# Patient Record
Sex: FEMALE | Race: WHITE | NOT HISPANIC OR LATINO | Employment: PART TIME | URBAN - METROPOLITAN AREA
[De-identification: names, ages, dates, MRNs, and addresses within clinical notes are randomized per-mention and may not be internally consistent; named-entity substitution may affect disease eponyms.]

---

## 2018-08-14 ENCOUNTER — OFFICE VISIT CONVERTED (OUTPATIENT)
Dept: SURGERY | Facility: CLINIC | Age: 53
End: 2018-08-14
Attending: SURGERY

## 2018-09-07 ENCOUNTER — OFFICE VISIT CONVERTED (OUTPATIENT)
Dept: SURGERY | Facility: CLINIC | Age: 53
End: 2018-09-07
Attending: SURGERY

## 2018-09-13 ENCOUNTER — OFFICE VISIT CONVERTED (OUTPATIENT)
Dept: GASTROENTEROLOGY | Facility: CLINIC | Age: 53
End: 2018-09-13
Attending: PHYSICIAN ASSISTANT

## 2018-09-13 ENCOUNTER — CONVERSION ENCOUNTER (OUTPATIENT)
Dept: GASTROENTEROLOGY | Facility: CLINIC | Age: 53
End: 2018-09-13

## 2021-05-16 VITALS
HEART RATE: 98 BPM | OXYGEN SATURATION: 100 % | DIASTOLIC BLOOD PRESSURE: 63 MMHG | SYSTOLIC BLOOD PRESSURE: 114 MMHG | HEIGHT: 66 IN | RESPIRATION RATE: 16 BRPM | WEIGHT: 120 LBS | BODY MASS INDEX: 19.29 KG/M2

## 2021-05-16 VITALS — BODY MASS INDEX: 19.93 KG/M2 | HEIGHT: 66 IN | RESPIRATION RATE: 14 BRPM | WEIGHT: 124 LBS

## 2021-05-16 VITALS — WEIGHT: 124 LBS | BODY MASS INDEX: 19.93 KG/M2 | HEIGHT: 66 IN | RESPIRATION RATE: 14 BRPM

## 2023-01-27 ENCOUNTER — OFFICE VISIT (OUTPATIENT)
Dept: CARDIOLOGY | Facility: CLINIC | Age: 58
End: 2023-01-27
Payer: COMMERCIAL

## 2023-01-27 VITALS
BODY MASS INDEX: 22.4 KG/M2 | WEIGHT: 139.4 LBS | DIASTOLIC BLOOD PRESSURE: 72 MMHG | SYSTOLIC BLOOD PRESSURE: 137 MMHG | HEART RATE: 56 BPM | HEIGHT: 66 IN

## 2023-01-27 DIAGNOSIS — I49.3 FREQUENT PVCS: Primary | ICD-10-CM

## 2023-01-27 PROCEDURE — 99204 OFFICE O/P NEW MOD 45 MIN: CPT | Performed by: INTERNAL MEDICINE

## 2023-01-27 RX ORDER — METOPROLOL SUCCINATE 25 MG/1
12.5 TABLET, EXTENDED RELEASE ORAL DAILY
Qty: 90 TABLET | Refills: 3 | Status: SHIPPED | OUTPATIENT
Start: 2023-01-27

## 2023-01-27 RX ORDER — DIPHENHYDRAMINE HCL 25 MG
25 CAPSULE ORAL
COMMUNITY
End: 2023-03-16

## 2023-01-27 RX ORDER — FLUOXETINE HYDROCHLORIDE 20 MG/1
1 CAPSULE ORAL DAILY
COMMUNITY
Start: 2018-06-17

## 2023-01-27 NOTE — PROGRESS NOTES
"Chief Complaint  Palpitations (/)      History of Present Illness  Corrine Dias presents to [facility] CARDIOLOGY    This is a very pleasant 57-year-old lady with past medical history of depression/anxiety was referred to clinic for cardiac evaluation.  She has been having recurrent palpitations for a long time.  Her episodes are short-lived.  They are precipitated by excessive eating and fatigue.  She denies associated symptoms.  She has no other complaints.  She has no dyspnea, orthopnea, edema, presyncope or syncope.  She is not very physically active but denies symptoms with routine daily activities.  She had Holter monitor which demonstrated very frequent PVCs and was referred to us for further evaluation.    Past Medical History:   Diagnosis Date   • Sleep apnea          Current Outpatient Medications:   •  aspirin 81 MG oral suspension, , Disp: , Rfl:   •  diphenhydrAMINE (BENADRYL) 25 mg capsule, Take 25 mg by mouth., Disp: , Rfl:   •  FLUoxetine (PROzac) 20 MG capsule, Take 1 capsule by mouth Daily., Disp: , Rfl:   •  TRAZODONE HCL PO, , Disp: , Rfl:   •  metoprolol succinate XL (TOPROL-XL) 25 MG 24 hr tablet, Take 0.5 tablets by mouth Daily., Disp: 90 tablet, Rfl: 3    There are no discontinued medications.  Allergies   Allergen Reactions   • Penicillins Rash        Social History     Tobacco Use   • Smoking status: Former     Packs/day: 0.50     Years: 36.00     Pack years: 18.00     Types: Cigarettes     Start date: 1/15/1985     Quit date: 3/12/2021     Years since quittin.8   Substance Use Topics   • Alcohol use: Yes     Alcohol/week: 15.0 standard drinks     Types: 15 Cans of beer per week     Comment: I have a few beers most days.   • Drug use: Never       Family History   Problem Relation Age of Onset   • Hypertension Mother         Objective     /72   Pulse 56   Ht 167.6 cm (66\")   Wt 63.2 kg (139 lb 6.4 oz)   BMI 22.50 kg/m²       Physical " Exam  Constitutional:       General: Awake. Not in acute distress.     Appearance: Normal appearance.   Neck:      Vascular: No carotid bruit, hepatojugular reflux or JVD.   Cardiovascular:      Rate and Rhythm: Normal rate and regular rhythm.      Chest Wall: PMI is not displaced.      Heart sounds: Normal heart sounds, S1 normal and S2 normal. No murmur heard.   No friction rub. No gallop. No S3 or S4 sounds.    Pulmonary:      Effort: Pulmonary effort is normal.      Breath sounds: Normal breath sounds. No wheezing, rhonchi or rales.   Ext.      Right lower leg: No edema.      Left lower leg: No edema.   Skin:     General: Skin is warm and dry.      Coloration: Skin is not cyanotic.      Findings: No petechiae or rash.   Neurological:      Mental Status: Alert and oriented x 3  Psychiatric:         Behavior: Behavior is cooperative.       Result Review :     No results found for: PROBNP              Diagnoses and all orders for this visit:    1. Frequent PVCs (Primary)  -     Treadmill Stress Test; Future  -     Magnesium; Future  -     metoprolol succinate XL (TOPROL-XL) 25 MG 24 hr tablet; Take 0.5 tablets by mouth Daily.  Dispense: 90 tablet; Refill: 3  -     Holter Monitor - 24 Hour; Future      Assessment: Patient with very frequent PVCs and by recent cardiac monitor.  She has frequent short-lived palpitations.  Apart from that, she has no other symptoms.  Her exam today is unremarkable.  ECG shows normal sinus rhythm with frequent unifocal PVCs.  Blood work was reviewed and was unremarkable.  The patient will be started on metoprolol XL 12.5 mg daily.  She was advised to increase the dose to 25 mg daily in a week if her heart rate remained stable/above 50 beats per minute.  Repeat Holter monitor will be done in 3 to 4 weeks.  Patient will be scheduled for treadmill stress test to assess for exercise-induced arrhythmias and ischemic ST-T changes.  She is already scheduled for echocardiogram next week which  she was advised to keep.  I will check magnesium level.  Avoidance of excessive caffeine was recommended.  Further recommendations to follow.    Follow Up       No follow-ups on file.    Patient was given instructions and counseling regarding her condition or for health maintenance advice. Please see specific information pulled into the AVS if appropriate.

## 2023-02-10 ENCOUNTER — TELEPHONE (OUTPATIENT)
Dept: CARDIOLOGY | Facility: CLINIC | Age: 58
End: 2023-02-10
Payer: COMMERCIAL

## 2023-02-10 NOTE — TELEPHONE ENCOUNTER
She has a stress test and holter upcoming that will potentially help determine the cause of her symptoms. I would recommend she give the metoprolol full dose an opportunity to work. Diarrhea is not a common side effect of metoprolol and if it didn't cause that at the lower dose I wouldn't expect it to cause it at the increased dose.

## 2023-02-10 NOTE — TELEPHONE ENCOUNTER
Patient called stating after she starting the whole tablet of Metoprolol as directed, she started experiencing diarrhea, patient also feels as if this medication has not helped.     Please advise.

## 2023-02-13 NOTE — TELEPHONE ENCOUNTER
BLAYNE patient regarding medication. Patient verbalized understanding and appreciation- states she will give it some time and will improve her eating habits to assist in eliminating diarrhea.

## 2023-03-16 ENCOUNTER — OFFICE VISIT (OUTPATIENT)
Dept: CARDIOLOGY | Facility: CLINIC | Age: 58
End: 2023-03-16
Payer: COMMERCIAL

## 2023-03-16 VITALS
SYSTOLIC BLOOD PRESSURE: 130 MMHG | BODY MASS INDEX: 22.5 KG/M2 | DIASTOLIC BLOOD PRESSURE: 71 MMHG | WEIGHT: 140 LBS | HEIGHT: 66 IN | HEART RATE: 59 BPM

## 2023-03-16 DIAGNOSIS — I49.3 FREQUENT PVCS: Primary | ICD-10-CM

## 2023-03-16 PROCEDURE — 99213 OFFICE O/P EST LOW 20 MIN: CPT

## 2023-03-16 RX ORDER — CETIRIZINE HYDROCHLORIDE 10 MG/1
10 TABLET ORAL DAILY
COMMUNITY

## 2023-03-16 NOTE — PROGRESS NOTES
Chief Complaint  Frequent PVCs    Subjective        History of Present Illness  Corrine Dias presents to [facility] CARDIOLOGY for follow up.  57-year-old female with past medical history significant for sleep apnea and frequent PVCs.  She is following up on a recent Holter monitor.  She continues to have palpitations that have improved on her current dose of metoprolol.  She is compliant with her medication.  She denies any chest pain at all, but does get dyspneic with exertion.  She denies orthopnea, edema, syncope.  PMH  Past Medical History:   Diagnosis Date   • Sleep apnea 2016       ALLERGY  Allergies   Allergen Reactions   • Penicillins Rash        SURGICALHX  History reviewed. No pertinent surgical history.     SOC  Social History     Socioeconomic History   • Marital status:    Tobacco Use   • Smoking status: Former     Packs/day: 0.50     Years: 36.00     Pack years: 18.00     Types: Cigarettes     Start date: 1/15/1985     Quit date: 3/12/2021     Years since quittin.0   Substance and Sexual Activity   • Alcohol use: Yes     Alcohol/week: 15.0 standard drinks     Types: 15 Cans of beer per week     Comment: I have a few beers most days.   • Drug use: Never   • Sexual activity: Yes     Partners: Male     Birth control/protection: Post-menopausal       FAMHX  Family History   Problem Relation Age of Onset   • Hypertension Mother         MEDSIGONLY  Current Outpatient Medications on File Prior to Visit   Medication Sig   • aspirin 81 MG oral suspension    • cetirizine (zyrTEC) 10 MG tablet Take 1 tablet by mouth Daily.   • FLUoxetine (PROzac) 20 MG capsule Take 1 capsule by mouth Daily.   • metoprolol succinate XL (TOPROL-XL) 25 MG 24 hr tablet Take 0.5 tablets by mouth Daily.   • TRAZODONE HCL PO    • [DISCONTINUED] diphenhydrAMINE (BENADRYL) 25 mg capsule Take 25 mg by mouth. (Patient not taking: Reported on 3/16/2023)     No current facility-administered medications on  "file prior to visit.       Objective   Vitals:    03/16/23 1118 03/16/23 1122   BP: 145/73 130/71   Pulse: 58 59   Weight: 63.5 kg (140 lb)    Height: 167.6 cm (66\")          Physical Exam  Constitutional:       General: She is awake. She is not in acute distress.     Appearance: Normal appearance.   HENT:      Head: Normocephalic.      Nose: Nose normal. No congestion.   Eyes:      Extraocular Movements: Extraocular movements intact.      Conjunctiva/sclera: Conjunctivae normal.      Pupils: Pupils are equal, round, and reactive to light.   Neck:      Thyroid: No thyromegaly.      Vascular: No JVD.   Cardiovascular:      Rate and Rhythm: Normal rate. Rhythm irregular.      Chest Wall: PMI is not displaced.      Pulses: Normal pulses.      Heart sounds: Normal heart sounds, S1 normal and S2 normal. No murmur heard.    No friction rub. No gallop. No S3 or S4 sounds.   Pulmonary:      Effort: Pulmonary effort is normal.      Breath sounds: Normal breath sounds. No wheezing, rhonchi or rales.   Abdominal:      General: Bowel sounds are normal.      Palpations: Abdomen is soft.      Tenderness: There is no abdominal tenderness.   Musculoskeletal:      Cervical back: No tenderness.      Right lower leg: No edema.      Left lower leg: No edema.   Lymphadenopathy:      Cervical: No cervical adenopathy.   Skin:     General: Skin is warm and dry.      Capillary Refill: Capillary refill takes less than 2 seconds.      Coloration: Skin is not cyanotic.      Findings: No petechiae or rash.      Nails: There is no clubbing.   Neurological:      Mental Status: She is alert.   Psychiatric:         Mood and Affect: Mood normal.         Behavior: Behavior is cooperative.           Result Review     The following data was reviewed by ANGIE Green on 03/16/23.    No results found for: PROBNP             Results for orders placed in visit on 02/23/23    Treadmill Stress Test    Interpretation Summary  •  Excellent exercise " tolerance noted.  •  There was no chest pain with exercise.  •  Baseline EKG showed sinus rhythm with frequent isolated PVCs.  There was a significant decrease in PVC frequency during exercise.  •  EKG at peak heart rate did not show any ST segment changes to suggest ischemia.  Patient reached only 72% of the maximum predicted heart rate.  •  Conclusion : Exercise treadmill stress test is negative for ischemia at the workload achieved.  However patient could reach only 72% of the maximum predicted heart rate making the test somewhat suboptimal.           Assessment & Plan  Diagnoses and all orders for this visit:    1. Frequent PVCs (Primary)  -     Ambulatory Referral to Cardiac Electrophysiology    Recent echocardiogram demonstrated preserved RV and LV function with an ejection fraction of 54%, mild mitral regurgitation, trivial aortic insufficiency.    Recent Holter monitor demonstrated frequent PVCs constituting 18% of the total beats mostly in isolated form with few couplets noted.    Recent treadmill stress test was negative for ischemia albeit suboptimal due to inability to achieve target heart rate.    Continue metoprolol at 12.5 mg daily.  Of note she does not tolerate higher dose.  Will place referral to EP.    Follow Up   Return in about 4 months (around 7/16/2023) for With .    Patient was given instructions and counseling regarding her condition or for health maintenance advice. Please see specific information pulled into the AVS if appropriate.     Susan Sommers, ANGIE  03/16/23  08:51 EDT    Dictated Utilizing Dragon Dictation

## 2023-06-12 NOTE — PROGRESS NOTES
Electrophysiology Clinic Consult     Corrine Dias  1965  [unfilled]  [unfilled]    06/13/23    DATE OF ADMISSION: (Not on file)  [facility] CARDIOLOGY Jac Beasley, APRN  [address] / [place] [address]  Referring Provider: Susan Sommers*     CC: palpitations    Problem list:   PVCs  Echo 2/6/19: EF 55%  Metoprolol initiated   24h holter monitor 1/18/23: frequent PVCs, multiple episodes of bigeminy, trigeminy and quadrigeminy. Avg HR 86 bpm, HR range  bpm.   24h Holter monitor 2/28/23: PVC 18% burden, avg HR 72, range HR .   Stress test 2/23/23: frequent PVCs, no ischemia however only reached 72% of max predicted heart rate, somewhat suboptimal test  Echo 2/1/23: LVEF: 54%, mild left atrial enlargement, mild MR  Sleep apnea - cpap  Former smoker   Anxiety disorder.  At    EKG with PVCs 12/9/22    History of Present Illness:   Corrine Dias is a 57 year old female with above PMHx referred by ANGIE Harley for management of PVCs. She was told she had PVCs during pregnancy 28 years ago. Symptoms of skipped beat, heart racing, fatigue, intermittent SOB, dizziness and LH. Episodes have increased in frequency in the last year.  It in severity.  Her symptoms are not improved or worsened by any particular maneuver or medication.  She was started on Metoprolol and she has not noticed a difference in symptoms but she has noted increased fatigue. BP is well controlled.  Denies any near-syncope or syncope.  No active chest pain or chest tightness.  Denies any weight loss supplements.  No over-the-counter stimulants.  Caffeine: none  Alcohol: 2-3 beers per day   Stimulant: none  Nicotine: none, quit smoking in 2021      Allergies   Allergen Reactions    Penicillins Rash        Cannot display prior to admission medications because the patient has not been admitted in this contact.              Current Outpatient Medications:     aspirin 81 MG  oral suspension, , Disp: , Rfl:     cetirizine (zyrTEC) 10 MG tablet, Take 1 tablet by mouth Daily., Disp: , Rfl:     FLUoxetine (PROzac) 20 MG capsule, Take 1 capsule by mouth Daily., Disp: , Rfl:     metoprolol succinate XL (TOPROL-XL) 25 MG 24 hr tablet, Take 0.5 tablets by mouth Daily., Disp: 90 tablet, Rfl: 3    traZODone (DESYREL) 50 MG tablet, Take 1 tablet by mouth Daily., Disp: , Rfl:     Social History     Socioeconomic History    Marital status:    Tobacco Use    Smoking status: Former     Packs/day: 0.50     Years: 36.00     Pack years: 18.00     Types: Cigarettes     Start date: 1/15/1985     Quit date: 3/12/2021     Years since quittin.2    Smokeless tobacco: Never   Substance and Sexual Activity    Alcohol use: Yes     Alcohol/week: 15.0 standard drinks     Types: 15 Cans of beer per week     Comment: I have a few beers most days.    Drug use: Never    Sexual activity: Yes     Partners: Male     Birth control/protection: Post-menopausal       Family History   Problem Relation Age of Onset    Hypertension Mother        REVIEW OF SYSTEMS:   CONST:  No weight loss, fever, chills, +weakness + fatigue.   HEENT:  No visual loss, blurred vision, double vision, yellow sclerae.                   No hearing loss, congestion, sore throat.   SKIN:      No rashes, urticaria, ulcers, sores.     RESP:     + shortness of breath, -hemoptysis, cough, sputum.   GI:           No anorexia, nausea, vomiting, diarrhea. No abdominal pain, melena.   :         No burning on urination, hematuria or increased frequency.  ENDO:    No diaphoresis, cold or heat intolerance. No polyuria or polydipsia.   NEURO:  No headache, +dizziness, -syncope, paralysis, ataxia, or parasthesias.                  No change in bowel or bladder control. No history of CVA/TIA  MUSC:    No muscle, back pain, joint pain or stiffness.   HEME:    No anemia, bleeding, bruising. No history of DVT/PE.  PSYCH:  + history of depression,  "anxiety    Vitals:    06/13/23 1230   BP: 134/84   BP Location: Left arm   Patient Position: Sitting   Pulse: 52   Weight: 63.5 kg (140 lb)   Height: 167.6 cm (66\")                 Physical Exam:  GEN: Well nourished, well-developed, no acute distress  HEENT: Normocephalic, atraumatic, PERRLA, moist mucous membranes  NECK: Supple, NO JVD, no thyromegaly, no lymphadenopathy  CARD: S1S2, bradyca regular rate rhythm normal S1-S2 bradycardic no S3-S4 murmurs  ABDOMEN: Soft, nontender, normal bowel sounds  EXTREMITIES: No gross deformities, no clubbing, cyanosis, or edema  SKIN: Warm, dry  NEURO: No focal deficits, alert and oriented x 3  PSYCHIATRIC: Normal affect and mood      I personally viewed and interpreted the patient's EKG/Telemetry/lab data    No results found for: GLUCOSE, CALCIUM, NA, K, CO2, CL, BUN, CREATININE, EGFRIFAFRI, EGFRIFNONA, BCR, ANIONGAP  No results found for: WBC, HGB, HCT, MCV, PLT  No results found for: INR, PROTIME  No results found for: TSH, M1YQBGV, W4DWALC, THYROIDAB        ECG 12 Lead    Date/Time: 6/13/2023 1:13 PM  Performed by: Jersey Barclay MD  Authorized by: Jersey Barclay MD   Comparison: compared with previous ECG from 12/9/2022  Comparison to previous ECG: PVC resolved  Rhythm: sinus bradycardia  BPM: 52          ICD-10-CM ICD-9-CM   1. PVC (premature ventricular contraction)  I49.3 427.69   2. PEPE (obstructive sleep apnea)  G47.33 327.23       Assessment and Plan:   PVCs  -symptoms of skipped beat, heart racing, fatigue, intermittent SOB, dizziness and LH. Episodes have increased in frequency in the last year. She was started on Metoprolol and she has not noticed a difference in symptoms but she has noted increased fatigue.   -24h Holter monitor 2/28/23: PVC 18% burden, avg HR 72, range HR .   -Echo 2/1/23: LVEF: 54%, mild left atrial enlargement, mild MR  -Discussed options of starting  antiarrhythmic medication vs. EPS +/- catheter ablation of PVCs. The risks, " benefits, and alternatives of the procedure and medication have been reviewed and the patient wishes to proceed with Flecainide. Stop Metoprolol today. Start Flecainide 50mg BID on Monday 6/19/23, EKG 48 hours after starting following 5th dose of Flecainide 6/21/23.       2. Sleep apnea - cpap     Scribed for Jersey Barclay MD by ANGIE Diaz. 6/13/2023  13:14 EDT    I, Jersey Barclay MD, personally performed the services described in this documentation as scribed by the above named individual in my presence, and it is both accurate and complete.  6/13/2023  13:14 EDT

## 2023-06-13 ENCOUNTER — OFFICE VISIT (OUTPATIENT)
Dept: CARDIOLOGY | Facility: CLINIC | Age: 58
End: 2023-06-13
Payer: COMMERCIAL

## 2023-06-13 VITALS
SYSTOLIC BLOOD PRESSURE: 134 MMHG | DIASTOLIC BLOOD PRESSURE: 84 MMHG | WEIGHT: 140 LBS | HEART RATE: 52 BPM | BODY MASS INDEX: 22.5 KG/M2 | HEIGHT: 66 IN

## 2023-06-13 DIAGNOSIS — I49.3 PVC (PREMATURE VENTRICULAR CONTRACTION): Primary | ICD-10-CM

## 2023-06-13 DIAGNOSIS — G47.33 OSA (OBSTRUCTIVE SLEEP APNEA): ICD-10-CM

## 2023-06-13 PROBLEM — G47.30 SLEEP APNEA: Status: ACTIVE | Noted: 2023-06-13

## 2023-06-13 RX ORDER — FLECAINIDE ACETATE 50 MG/1
50 TABLET ORAL 2 TIMES DAILY
Qty: 180 TABLET | Refills: 3 | Status: SHIPPED | OUTPATIENT
Start: 2023-06-13

## 2023-11-14 ENCOUNTER — OFFICE VISIT (OUTPATIENT)
Dept: CARDIOLOGY | Facility: CLINIC | Age: 58
End: 2023-11-14
Payer: COMMERCIAL

## 2023-11-14 VITALS
OXYGEN SATURATION: 97 % | DIASTOLIC BLOOD PRESSURE: 86 MMHG | HEIGHT: 66 IN | BODY MASS INDEX: 22.5 KG/M2 | SYSTOLIC BLOOD PRESSURE: 136 MMHG | WEIGHT: 140 LBS | HEART RATE: 58 BPM

## 2023-11-14 DIAGNOSIS — I49.3 PVC (PREMATURE VENTRICULAR CONTRACTION): Primary | ICD-10-CM

## 2023-11-14 DIAGNOSIS — G47.33 OBSTRUCTIVE SLEEP APNEA SYNDROME: ICD-10-CM

## 2023-11-14 PROCEDURE — 93000 ELECTROCARDIOGRAM COMPLETE: CPT | Performed by: PHYSICIAN ASSISTANT

## 2023-11-14 PROCEDURE — 99214 OFFICE O/P EST MOD 30 MIN: CPT | Performed by: PHYSICIAN ASSISTANT

## 2023-11-14 NOTE — PROGRESS NOTES
Corrine Padron Prescott  1965  [phone]    11/14/2023    [facility] CARDIOLOGY     Referring Provider: No ref. provider found     Jac Aleman, APRN  [address]  [facility] [address]    Chief Complaint   Patient presents with    PVC (premature ventricular contraction)       Problem List:   Problem list:   PVCs  Echo 2/6/19: EF 55%  Metoprolol initiated   24h holter monitor 1/18/23: frequent PVCs, multiple episodes of bigeminy, trigeminy and quadrigeminy. Avg HR 86 bpm, HR range  bpm.   24h Holter monitor 2/28/23: PVC 18% burden, avg HR 72, range HR .   Stress test 2/23/23: frequent PVCs, no ischemia however only reached 72% of max predicted heart rate, somewhat suboptimal test  Echo 2/1/23: LVEF: 54%, mild left atrial enlargement, mild MR  Sleep apnea - cpap  Former smoker   Anxiety disorder.    Allergies  Allergies   Allergen Reactions    Penicillins Rash       Current Medications    Current Outpatient Medications:     aspirin 81 MG oral suspension, , Disp: , Rfl:     cetirizine (zyrTEC) 10 MG tablet, Take 1 tablet by mouth Daily., Disp: , Rfl:     flecainide (TAMBOCOR) 50 MG tablet, Take 1 tablet by mouth 2 (Two) Times a Day. Stop Metoprolol, Disp: 180 tablet, Rfl: 3    FLUoxetine (PROzac) 20 MG capsule, Take 1 capsule by mouth Daily., Disp: , Rfl:     traZODone (DESYREL) 50 MG tablet, Take 1 tablet by mouth Daily., Disp: , Rfl:     History of Present Illness     Pt presents for follow up of PVCs on Flecainide. Since we last saw the pt, she is doing much better on Flecainide in regards to her PVCs. She is happy with how it has helped her. She  SOB, CP, LH, and dizziness or syncope. Denies any hospitalizations, ER visits, Overall feels well.    ROS:  General:  Denies fatigue, weight gain or loss  Cardiovascular:  Denies CP, PND, syncope, near syncope, edema or palpitations.  Pulmonary:  Denies ARANDA, cough, or wheezing      Vitals:    11/14/23 1429   BP: 136/86   BP  "Location: Left arm   Patient Position: Sitting   Pulse: 58   SpO2: 97%   Weight: 63.5 kg (140 lb)   Height: 167.6 cm (66\")     Body mass index is 22.6 kg/m².  PE:  General: NAD  Neck: no JVD, no carotid bruits, no TM  Heart RRR, NL S1, S2, S4 present, no rubs, murmurs  Lungs: CTA, no wheezes, rhonchi, or rales  Abd: soft, non-tender, NL BS  Ext: No musculoskeletal deformities, no edema, cyanosis, or clubbing  Psych: normal mood and affect    Diagnostic Data:        ECG 12 Lead    Date/Time: 11/14/2023 3:19 PM  Performed by: Ayesha Albright PA    Authorized by: Ayesha Albright PA  Comparison: compared with previous ECG from 6/21/2023  Similar to previous ECG  Rhythm: sinus rhythm  BPM: 58              1. PVC (premature ventricular contraction)    2. Obstructive sleep apnea syndrome          Plan:  PVCs:   24h Holter monitor 2/28/23: PVC 18% burden, avg HR 72, range HR .   -Echo 2/1/23: LVEF: 54%, mild left atrial enlargement, mild MR  - doing well on Flecainide 50 mg BID. QRS normal on EKG today.   2. PEPE: CPAP    F/up in 6-8  months    Electronically signed by HALIE Mack, 11/14/23, 3:18 PM EST.    "

## 2024-05-31 RX ORDER — FLECAINIDE ACETATE 50 MG/1
50 TABLET ORAL 2 TIMES DAILY
Qty: 60 TABLET | Refills: 1 | Status: SHIPPED | OUTPATIENT
Start: 2024-05-31

## 2024-06-11 ENCOUNTER — OFFICE VISIT (OUTPATIENT)
Dept: CARDIOLOGY | Facility: CLINIC | Age: 59
End: 2024-06-11
Payer: COMMERCIAL

## 2024-06-11 VITALS
BODY MASS INDEX: 22.5 KG/M2 | WEIGHT: 140 LBS | HEIGHT: 66 IN | HEART RATE: 53 BPM | OXYGEN SATURATION: 98 % | SYSTOLIC BLOOD PRESSURE: 136 MMHG | DIASTOLIC BLOOD PRESSURE: 78 MMHG

## 2024-06-11 DIAGNOSIS — I49.3 PVC (PREMATURE VENTRICULAR CONTRACTION): Primary | ICD-10-CM

## 2024-06-11 PROCEDURE — 99213 OFFICE O/P EST LOW 20 MIN: CPT | Performed by: INTERNAL MEDICINE

## 2024-06-11 PROCEDURE — 93000 ELECTROCARDIOGRAM COMPLETE: CPT | Performed by: INTERNAL MEDICINE

## 2024-06-11 RX ORDER — UREA 10 %
5 LOTION (ML) TOPICAL NIGHTLY
COMMUNITY

## 2024-06-11 NOTE — PROGRESS NOTES
Corrine Padron Morehead  1965  [phone]    06/11/2024    [facility] CARDIOLOGY     Referring Provider: No ref. provider found     Jac Aleman, APRN  [address]  [facility] [address]    Chief Complaint   Patient presents with    premature ventricular contraction       Problem List:     PVCs  Echo 2/6/19: EF 55%  Metoprolol initiated   24h holter monitor 1/18/23: frequent PVCs, multiple episodes of bigeminy, trigeminy and quadrigeminy. Avg HR 86 bpm, HR range  bpm.   24h Holter monitor 2/28/23: PVC 18% burden, avg HR 72, range HR .   Stress test 2/23/23: frequent PVCs, no ischemia however only reached 72% of max predicted heart rate, somewhat suboptimal test  Echo 2/1/23: LVEF: 54%, mild left atrial enlargement, mild MR  Sleep apnea - cpap  Former smoker   Anxiety disorder.     Allergies  Allergies   Allergen Reactions    Penicillins Rash       Current Medications    Current Outpatient Medications:     aspirin 81 MG oral suspension, , Disp: , Rfl:     cetirizine (zyrTEC) 10 MG tablet, Take 1 tablet by mouth Daily., Disp: , Rfl:     flecainide (TAMBOCOR) 50 MG tablet, TAKE 1 TABLET BY MOUTH 2 (TWO) TIMES A DAY. STOP METOPROLOL, Disp: 60 tablet, Rfl: 1    FLUoxetine (PROzac) 20 MG capsule, Take 1 capsule by mouth Daily., Disp: , Rfl:     melatonin 5 MG tablet tablet, Take 1 tablet by mouth Every Night., Disp: , Rfl:     traZODone (DESYREL) 50 MG tablet, Take 1 tablet by mouth Daily., Disp: , Rfl:     History of Present Illness     Pt presents for follow up of PVC. Since we last saw the pt, pt denies sustained tachypalpitations, SOB, CP, LH, and dizziness. Denies any hospitalizations, ER visits, bleeding, or TIA/CVA symptoms. Overall feels well.  Blood pressure been well-controlled.  She has been compliant with her medical therapy.  No other complaints at this time.  She does continue to use CPAP at nighttime.          Vitals:    06/11/24 1406   BP: 136/78   BP Location:  "Left arm   Patient Position: Sitting   Pulse: 53   SpO2: 98%   Weight: 63.5 kg (140 lb)   Height: 167.6 cm (66\")     Body mass index is 22.6 kg/m².  PE:  General: NAD  Neck: no JVD, no carotid bruits, no TM  Heart RRR, NL S1, S2, S4 present, no rubs, murmurs  Lungs: CTA, no wheezes, rhonchi, or rales  Abd: soft, non-tender, NL BS  Ext: No musculoskeletal deformities, no edema, cyanosis, or clubbing  Psych: normal mood and affect    Diagnostic Data:        ECG 12 Lead    Date/Time: 6/11/2024 2:25 PM  Performed by: Jersey Barclay MD    Authorized by: Jersey Barclay MD  Comparison: compared with previous ECG from 11/14/2023  Similar to previous ECG  Rhythm: sinus bradycardia  BPM: 52              1. PVC (premature ventricular contraction)          Plan:    PVCs:   24h Holter monitor 2/28/23: PVC 18% burden, avg HR 72, range HR .   -Echo 2/1/23: LVEF: 54%, mild left atrial enlargement, mild MR  - doing well on Flecainide 50 mg BID. QRS normal on EKG today.   2. PEPE: CPAP    F/up in 6 months      "

## 2024-07-26 RX ORDER — FLECAINIDE ACETATE 50 MG/1
50 TABLET ORAL 2 TIMES DAILY
Qty: 60 TABLET | Refills: 3 | Status: SHIPPED | OUTPATIENT
Start: 2024-07-26

## 2024-12-11 RX ORDER — FLECAINIDE ACETATE 50 MG/1
TABLET ORAL
Qty: 60 TABLET | Refills: 6 | Status: SHIPPED | OUTPATIENT
Start: 2024-12-11

## 2024-12-16 ENCOUNTER — TELEPHONE (OUTPATIENT)
Dept: GASTROENTEROLOGY | Facility: CLINIC | Age: 59
End: 2024-12-16
Payer: COMMERCIAL

## 2024-12-16 ENCOUNTER — PREP FOR SURGERY (OUTPATIENT)
Dept: OTHER | Facility: HOSPITAL | Age: 59
End: 2024-12-16
Payer: COMMERCIAL

## 2024-12-16 ENCOUNTER — CLINICAL SUPPORT (OUTPATIENT)
Dept: GASTROENTEROLOGY | Facility: CLINIC | Age: 59
End: 2024-12-16
Payer: COMMERCIAL

## 2024-12-16 DIAGNOSIS — Z86.0100 HISTORY OF COLON POLYPS: ICD-10-CM

## 2024-12-16 DIAGNOSIS — Z12.11 COLON CANCER SCREENING: Primary | ICD-10-CM

## 2024-12-16 DIAGNOSIS — Z80.0 FAMILY HISTORY OF COLON CANCER: ICD-10-CM

## 2024-12-16 RX ORDER — SODIUM, POTASSIUM,MAG SULFATES 17.5-3.13G
1 SOLUTION, RECONSTITUTED, ORAL ORAL EVERY 12 HOURS
Qty: 354 ML | Refills: 0 | Status: SHIPPED | OUTPATIENT
Start: 2024-12-16

## 2024-12-16 NOTE — PROGRESS NOTES
Corrine Dias  1965  58 y.o.    Reason for call: 5 year recall - DUE 2023, LAST COLON 9/2018 KM, HX OF COLON POLYPS, FM HX OF COLON CANCER  Prep prescribed: Suprep  Prep instructions reviewed with patient and sent to patient via Wik[employer]  Is the patient currently on any injectable or oral medications for weight loss or diabetes? No  Clearance needed? Yes  If yes, what clearance is needed? Cardiology  Clearance has been requested from DR. MARCH  The patient has been scheduled for: Colonoscopy    Corrine Dias is aware they have been scheduled for a screening colonoscopy. Patient has expressed they are not having any symptoms at all.     After your procedure, you will be contacted with results. Please confirm the best phone # to reach the patient: [phone]  Family history of colon cancer? Yes  If yes, indicate relative: FATHER  Tentative Procedure Date: 2/6/2025    Date/Place of last Scope: 9/2018 ; [facility]  Able to obtain report? yes    Family History   Problem Relation Age of Onset    Hypertension Mother     Colon polyps Mother     Irritable bowel syndrome Mother     Colon cancer Father      Past Medical History:   Diagnosis Date    Abnormal ECG 2023    Anemia     This was many years ago, nothing recent.    Cholelithiasis     Surgery in 2018    Colon polyp     I had some polyps removed during first colonoscopy.    Sleep apnea 2016     Allergies   Allergen Reactions    Penicillins Rash     Past Surgical History:   Procedure Laterality Date    CHOLECYSTECTOMY      August 2018    COLONOSCOPY       Social History     Socioeconomic History    Marital status:    Tobacco Use    Smoking status: Former     Current packs/day: 0.00     Average packs/day: 0.5 packs/day for 36.2 years (18.1 ttl pk-yrs)     Types: Cigarettes     Start date: 1/15/1985     Quit date: 3/12/2021     Years since quitting: 3.7     Passive exposure: Past    Smokeless tobacco: Never   Substance and Sexual Activity     Alcohol use: Yes     Alcohol/week: 15.0 standard drinks of alcohol     Types: 15 Cans of beer per week     Comment: I have a few beers most days.    Drug use: Never    Sexual activity: Yes     Partners: Male     Birth control/protection: Post-menopausal       Current Outpatient Medications:     aspirin 81 MG oral suspension, , Disp: , Rfl:     cetirizine (zyrTEC) 10 MG tablet, Take 1 tablet by mouth Daily., Disp: , Rfl:     flecainide (TAMBOCOR) 50 MG tablet, TAKE 1 TABLET BY MOUTH 2 (TWO) TIMES A DAY *STOP TAKING METOPROLOL*., Disp: 60 tablet, Rfl: 6    FLUoxetine (PROzac) 20 MG capsule, Take 1 capsule by mouth Daily., Disp: , Rfl:     fluticasone (VERAMYST) 27.5 MCG/SPRAY nasal spray, Administer 2 sprays into the nostril(s) as directed by provider Daily., Disp: , Rfl:     melatonin 5 MG tablet tablet, Take 1 tablet by mouth Every Night., Disp: , Rfl:     traZODone (DESYREL) 50 MG tablet, Take 1 tablet by mouth Daily., Disp: , Rfl:

## 2024-12-16 NOTE — TELEPHONE ENCOUNTER
2024    Dear Jersey Barclay MD,      Patient Name: Corrine Dias  : 1965      This patient is scheduled to have a Colonoscopy which I will perform at [facility] on 25. Please respond to this request noting your recommendations regarding clearance from a Cardiac  standpoint.  You may contact our office at [phone] Option 1 with any questions. I appreciate your prompt response in this matter. Please return this form to our office as soon as possible to [phone].    ____ I approve my patient from a Cardiac  standpoint    ____ I do NOT approve my patient from a Cardiac  standpoint at this time    Please inform our office if the patient requires additional follow-up from your office prior to scheduled procedure date.      Please specify clearance expiration date:____________________________________      Approving physician name (please print): _____________________________________________      Approving physician signature: ________________________________ Date:________________  Sincerely,  [facility] - Gastroenterology   Dr. Charan Rangel          Please fax approval or denial to our office as soon as possible.

## 2024-12-18 NOTE — TELEPHONE ENCOUNTER
Called Dr. Barclay's nurse requesting confirmation they received clearance request. No answer, LVM requesting a returned call.

## 2024-12-19 NOTE — TELEPHONE ENCOUNTER
[facility] Cardiology/ Electrophysiology   [address]  [place] [address]  PHONE: [phone]  FAX: [phone]      Date:12/19/24    To Whom It May Concern,    Corrine Dias 1965 can  proceed for scheduled procedure/surgery from an EP standpoint. Overall low cardiac risk. Any questions please call  [phone].             Sincerely,      Electronically signed by HALIE Mack, 12/19/24, 2:54 PM EST.

## 2025-01-29 NOTE — PRE-PROCEDURE INSTRUCTIONS
"Instructed on date and arrival time of 0630 Instructed that arrival time is not their procedure time but allows time to prepare for procedure.  Come to entrance \"C\". Must have  over age 18 to drive home.  May have two visitors; however, children under 12 must stay in waiting room.  Discussed clear liquid diet (no red or purple), bowel prep, and NPO.  May take medications as usual except for blood thinners, diabetic medications, and weight loss medications.  Verbalized understanding of instructions given.  Instructed to call for questions or concerns.  Cardiac clearance noted in chart.  "

## 2025-02-05 ENCOUNTER — ANESTHESIA EVENT (OUTPATIENT)
Dept: GASTROENTEROLOGY | Facility: HOSPITAL | Age: 60
End: 2025-02-05
Payer: COMMERCIAL

## 2025-02-05 RX ORDER — SODIUM CHLORIDE, SODIUM LACTATE, POTASSIUM CHLORIDE, CALCIUM CHLORIDE 600; 310; 30; 20 MG/100ML; MG/100ML; MG/100ML; MG/100ML
30 INJECTION, SOLUTION INTRAVENOUS CONTINUOUS
Status: CANCELLED | OUTPATIENT
Start: 2025-02-05 | End: 2025-02-06

## 2025-02-05 NOTE — ANESTHESIA PREPROCEDURE EVALUATION
Anesthesia Evaluation     Nursing notes reviewed   NPO Solid Status: > 8 hours  NPO Liquid Status: > 4 hours           Airway   Mallampati: II  TM distance: <3 FB  Neck ROM: full  No difficulty expected  Dental - normal exam     Pulmonary     breath sounds clear to auscultation  (+) a smoker (former) Former,sleep apnea on CPAP  Cardiovascular - normal exam  Exercise tolerance: good (4-7 METS)    ECG reviewed  Rhythm: regular  Rate: normal    (+) dysrhythmias (takes flecainide- last dose on 02/05 at hs) PVC    ROS comment: EKG 06/2024  Rhythm: sinus bradycardia  BPM: 52      ECHO 01/2023  CONCLUSION:  Preserved RV and LV function, mild left atrial enlargement.    Normal diastolic filling parameters.  Mild mitral regurgitation, trivial aortic   insufficiency.  The patient is in sinus rhythm with the study.     STRESS TEST 02/2023  ·  Excellent exercise tolerance noted.  ·  There was no chest pain with exercise.  ·  Baseline EKG showed sinus rhythm with frequent isolated PVCs.  There was a significant decrease in PVC frequency during exercise.  ·  EKG at peak heart rate did not show any ST segment changes to suggest ischemia.  Patient reached only 72% of the maximum predicted heart rate.  ·  Conclusion : Exercise treadmill stress test is negative for ischemia at the workload achieved.  However patient could reach only 72% of the maximum predicted heart rate making the test somewhat suboptimal.        Neuro/Psych  GI/Hepatic/Renal/Endo      Musculoskeletal     Abdominal    Substance History   (+) alcohol use (15 DRINKS/WK)     OB/GYN          Other        ROS/Med Hx Other: Screening, fam hx    CARDIAC CLEARANCE ON CHART 12/20/2024, PER DR MARCH    EKG 06/11/24: HR 52, sinus roseann    ECHO 02/03/23: EF 54%,   CONCLUSION:  Preserved RV and LV function, mild left atrial enlargement.  Normal diastolic filling parameters.  Mild mitral regurgitation, trivial aortic insufficiency.  The patient is in sinus rhythm with the  study.                   Anesthesia Plan    ASA 3     general   total IV anesthesia  (Total IV Anesthesia    Patient understands anesthesia not responsible for dental damage.        Discussed risks with pt including aspiration, allergic reactions, apnea, advanced airway placement. Pt verbalized understanding. All questions answered.   )  intravenous induction     Anesthetic plan, risks, benefits, and alternatives have been provided, discussed and informed consent has been obtained with: patient and spouse/significant other.  Pre-procedure education provided  Plan discussed with CRNA.      CODE STATUS:

## 2025-02-06 ENCOUNTER — ANESTHESIA (OUTPATIENT)
Dept: GASTROENTEROLOGY | Facility: HOSPITAL | Age: 60
End: 2025-02-06
Payer: COMMERCIAL

## 2025-02-06 ENCOUNTER — HOSPITAL ENCOUNTER (OUTPATIENT)
Facility: HOSPITAL | Age: 60
Setting detail: HOSPITAL OUTPATIENT SURGERY
Discharge: HOME | End: 2025-02-06
Attending: INTERNAL MEDICINE | Admitting: INTERNAL MEDICINE
Payer: COMMERCIAL

## 2025-02-06 VITALS
OXYGEN SATURATION: 97 % | TEMPERATURE: 96.9 F | BODY MASS INDEX: 21.92 KG/M2 | SYSTOLIC BLOOD PRESSURE: 122 MMHG | RESPIRATION RATE: 16 BRPM | WEIGHT: 135.8 LBS | HEART RATE: 67 BPM | DIASTOLIC BLOOD PRESSURE: 71 MMHG

## 2025-02-06 DIAGNOSIS — Z86.0100 HISTORY OF COLON POLYPS: ICD-10-CM

## 2025-02-06 DIAGNOSIS — Z80.0 FAMILY HISTORY OF COLON CANCER: ICD-10-CM

## 2025-02-06 DIAGNOSIS — Z12.11 COLON CANCER SCREENING: ICD-10-CM

## 2025-02-06 PROCEDURE — 25810000003 LACTATED RINGERS PER 1000 ML: Performed by: NURSE ANESTHETIST, CERTIFIED REGISTERED

## 2025-02-06 PROCEDURE — 88305 TISSUE EXAM BY PATHOLOGIST: CPT | Performed by: INTERNAL MEDICINE

## 2025-02-06 PROCEDURE — 25010000002 PROPOFOL 10 MG/ML EMULSION: Performed by: NURSE ANESTHETIST, CERTIFIED REGISTERED

## 2025-02-06 PROCEDURE — 45385 COLONOSCOPY W/LESION REMOVAL: CPT | Performed by: INTERNAL MEDICINE

## 2025-02-06 PROCEDURE — 25010000002 LIDOCAINE PF 2% 2 % SOLUTION: Performed by: NURSE ANESTHETIST, CERTIFIED REGISTERED

## 2025-02-06 RX ORDER — LIDOCAINE HYDROCHLORIDE 20 MG/ML
INJECTION, SOLUTION EPIDURAL; INFILTRATION; INTRACAUDAL; PERINEURAL AS NEEDED
Status: DISCONTINUED | OUTPATIENT
Start: 2025-02-06 | End: 2025-02-06 | Stop reason: SURG

## 2025-02-06 RX ORDER — SODIUM CHLORIDE, SODIUM LACTATE, POTASSIUM CHLORIDE, CALCIUM CHLORIDE 600; 310; 30; 20 MG/100ML; MG/100ML; MG/100ML; MG/100ML
INJECTION, SOLUTION INTRAVENOUS CONTINUOUS PRN
Status: DISCONTINUED | OUTPATIENT
Start: 2025-02-06 | End: 2025-02-06 | Stop reason: SURG

## 2025-02-06 RX ORDER — PROPOFOL 10 MG/ML
VIAL (ML) INTRAVENOUS AS NEEDED
Status: DISCONTINUED | OUTPATIENT
Start: 2025-02-06 | End: 2025-02-06 | Stop reason: SURG

## 2025-02-06 RX ADMIN — PROPOFOL 150 MCG/KG/MIN: 10 INJECTION, EMULSION INTRAVENOUS at 08:09

## 2025-02-06 RX ADMIN — LIDOCAINE HYDROCHLORIDE 100 MG: 20 INJECTION, SOLUTION INTRAVENOUS at 08:08

## 2025-02-06 RX ADMIN — PROPOFOL 100 MG: 10 INJECTION, EMULSION INTRAVENOUS at 08:08

## 2025-02-06 RX ADMIN — SODIUM CHLORIDE, POTASSIUM CHLORIDE, SODIUM LACTATE AND CALCIUM CHLORIDE: 600; 310; 30; 20 INJECTION, SOLUTION INTRAVENOUS at 08:06

## 2025-02-06 NOTE — H&P
ScreeningPre Procedure History & Physical    Chief Complaint:   Screening     Subjective     HPI:   Screening     Past Medical History:   Past Medical History:   Diagnosis Date    Abnormal ECG 2023    Anemia     This was many years ago, nothing recent.    Cholelithiasis     Surgery in 2018    Colon polyp     I had some polyps removed during first colonoscopy.    Sleep apnea 2016       Past Surgical History:  Past Surgical History:   Procedure Laterality Date    CHOLECYSTECTOMY      August 2018    COLONOSCOPY         Family History:  Family History   Problem Relation Age of Onset    Hypertension Mother     Colon polyps Mother     Irritable bowel syndrome Mother     Colon cancer Father        Social History:   reports that she quit smoking about 3 years ago. Her smoking use included cigarettes. She started smoking about 40 years ago. She has a 18.1 pack-year smoking history. She has been exposed to tobacco smoke. She has never used smokeless tobacco. She reports current alcohol use of about 15.0 standard drinks of alcohol per week. She reports that she does not use drugs.    Medications:   Medications Prior to Admission   Medication Sig Dispense Refill Last Dose/Taking    aspirin 81 MG oral suspension    2/5/2025    cetirizine (zyrTEC) 10 MG tablet Take 1 tablet by mouth Daily.   2/5/2025    flecainide (TAMBOCOR) 50 MG tablet TAKE 1 TABLET BY MOUTH 2 (TWO) TIMES A DAY *STOP TAKING METOPROLOL*. 60 tablet 6 2/5/2025    FLUoxetine (PROzac) 20 MG capsule Take 1 capsule by mouth Daily.   2/5/2025    melatonin 5 MG tablet tablet Take 1 tablet by mouth Every Night.   Past Week    traZODone (DESYREL) 50 MG tablet Take 1 tablet by mouth Daily.   2/5/2025    fluticasone (VERAMYST) 27.5 MCG/SPRAY nasal spray Administer 2 sprays into the nostril(s) as directed by provider Daily.          Allergies:  Penicillins        Objective     Blood pressure 122/57, pulse 68, temperature 98.1 °F (36.7 °C), temperature source Temporal, resp.  rate 16, weight 61.6 kg (135 lb 12.9 oz), SpO2 97%.    Physical Exam   Constitutional: Pt is oriented to person, place, and time and well-developed, well-nourished, and in no distress.   Mouth/Throat: Oropharynx is clear and moist.   Neck: Normal range of motion.   Cardiovascular: Normal rate, regular rhythm and normal heart sounds.    Pulmonary/Chest: Effort normal and breath sounds normal.   Abdominal: Soft. Nontender  Skin: Skin is warm and dry.   Psychiatric: Mood, memory, affect and judgment normal.     Assessment & Plan     Diagnosis:  Screening colonoscopy  H/o colon polyps     Anticipated Surgical Procedure:  colonoscopy    The risks, benefits, and alternatives of this procedure have been discussed with the patient or the responsible party- the patient understands and agrees to proceed.

## 2025-02-06 NOTE — ANESTHESIA POSTPROCEDURE EVALUATION
Patient: Corrine Dias    Procedure Summary       Date: 02/06/25 Room / Location: [facility] ENDOSCOPY 2 / [facility] ENDOSCOPY    Anesthesia Start: 0806 Anesthesia Stop: 0833    Procedure: COLONOSCOPY with cold snare polypectomy Diagnosis:       Colon cancer screening      History of colon polyps      Family history of colon cancer      (Colon cancer screening [Z12.11])      (History of colon polyps [Z86.0100])      (Family history of colon cancer [Z80.0])    Surgeons: Charan Rangel MD Provider: Cristian Benoit CRNA    Anesthesia Type: general ASA Status: 3            Anesthesia Type: general    Vitals  Vitals Value Taken Time   BP 99/58 02/06/25 0837   Temp 36.3 °C (97.4 °F) 02/06/25 0832   Pulse 71 02/06/25 0841   Resp 15 02/06/25 0837   SpO2 97 % 02/06/25 0841   Vitals shown include unfiled device data.        Post Anesthesia Care and Evaluation    Post-procedure mental status: acceptable.  Pain management: satisfactory to patient    Airway patency: patent  Anesthetic complications: No anesthetic complications    Cardiovascular status: acceptable  Respiratory status: acceptable    Comments: Per chart review

## 2025-02-07 LAB
CYTO UR: NORMAL
LAB AP CASE REPORT: NORMAL
LAB AP CLINICAL INFORMATION: NORMAL
PATH REPORT.FINAL DX SPEC: NORMAL
PATH REPORT.GROSS SPEC: NORMAL

## 2025-02-11 ENCOUNTER — OFFICE VISIT (OUTPATIENT)
Dept: CARDIOLOGY | Facility: CLINIC | Age: 60
End: 2025-02-11
Payer: COMMERCIAL

## 2025-02-11 VITALS
OXYGEN SATURATION: 98 % | DIASTOLIC BLOOD PRESSURE: 68 MMHG | HEIGHT: 66 IN | BODY MASS INDEX: 22.5 KG/M2 | WEIGHT: 140 LBS | HEART RATE: 63 BPM | SYSTOLIC BLOOD PRESSURE: 118 MMHG

## 2025-02-11 DIAGNOSIS — G47.30 SLEEP APNEA, UNSPECIFIED TYPE: Chronic | ICD-10-CM

## 2025-02-11 DIAGNOSIS — I49.3 PVC (PREMATURE VENTRICULAR CONTRACTION): Primary | Chronic | ICD-10-CM

## 2025-02-11 RX ORDER — FLECAINIDE ACETATE 50 MG/1
50 TABLET ORAL 2 TIMES DAILY
Qty: 180 TABLET | Refills: 3 | Status: SHIPPED | OUTPATIENT
Start: 2025-02-11

## 2025-02-12 NOTE — PROGRESS NOTES
Corrine Dias  [id number]  1965  [phone]      02/11/2025    [facility] CARDIOLOGY     Referring Provider: No ref. provider found     Jac Aleman, APRN  [address]  [facility] [address]    Chief Complaint   Patient presents with    PVC (premature ventricular contraction)       Problem List  PVCs  Echo 2/6/19: EF 55%  Metoprolol initiated   24h holter monitor 1/18/23: frequent PVCs, multiple episodes of bigeminy, trigeminy and quadrigeminy. Avg HR 86 bpm, HR range  bpm.   24h Holter monitor 2/28/23: PVC 18% burden, avg HR 72, range HR .   Stress test 2/23/23: frequent PVCs, no ischemia however only reached 72% of max predicted heart rate, somewhat suboptimal test  Echo 2/1/23: LVEF: 54%, mild left atrial enlargement, mild MR  Sleep apnea - cpap  Former smoker   Anxiety disorder.      History of Present Illness   Corrine Dias is a 59 y.o. female who presents to my electrophysiology clinic for follow up of PVCs.  Since we last saw the patient, she has been doing well from a cardiac standpoint.  She has occasional PVCs but at this time they are not bothersome.  She denies palpitations, shortness of breath, lightheadedness, dizziness and syncope.    Outpatient Medications Marked as Taking for the 2/11/25 encounter (Office Visit) with Romero Peng MD   Medication Sig Dispense Refill    aspirin 81 MG oral suspension       cetirizine (zyrTEC) 10 MG tablet Take 1 tablet by mouth Daily.      flecainide (TAMBOCOR) 50 MG tablet Take 1 tablet by mouth 2 (Two) Times a Day. 180 tablet 3    FLUoxetine (PROzac) 20 MG capsule Take 1 capsule by mouth Daily.      fluticasone (VERAMYST) 27.5 MCG/SPRAY nasal spray Administer 2 sprays into the nostril(s) as directed by provider Daily.      melatonin 5 MG tablet tablet Take 1 tablet by mouth Every Night.      traZODone (DESYREL) 50 MG tablet Take 1 tablet by mouth Daily.      [DISCONTINUED] flecainide (TAMBOCOR) 50 MG  "tablet TAKE 1 TABLET BY MOUTH 2 (TWO) TIMES A DAY *STOP TAKING METOPROLOL*. 60 tablet 6            Physical Exam  Vitals:    02/11/25 1414   BP: 118/68   BP Location: Left arm   Patient Position: Sitting   Pulse: 63   SpO2: 98%   Weight: 63.5 kg (140 lb)   Height: 167.6 cm (66\")     Body mass index is 22.6 kg/m².  Constitutional:       Appearance: Healthy appearance.   Neck:      Vascular: JVD normal.   Pulmonary:      Effort: Pulmonary effort is normal.      Breath sounds: Normal breath sounds.   Cardiovascular:      Normal rate. Regular rhythm. Normal S1. Normal S2.       Murmurs: There is no murmur.      No gallop.  No rub.   Pulses:     Intact distal pulses.   Edema:     Peripheral edema absent.   Neurological:      General: No focal deficit present.          Diagnostic Data    ECG 12 Lead    Date/Time: 2/12/2025 7:33 AM  Performed by: Keerthi Whelan APRN    Authorized by: Keerthi Whelan APRN  Comparison: compared with previous ECG from 6/11/2024  Rhythm: sinus rhythm  Ectopy: atrial premature contractions  Rate: normal  BPM: 63  QRS axis: normal    Clinical impression: normal ECG          No results found for: \"GLUCOSE\", \"CALCIUM\", \"NA\", \"K\", \"CO2\", \"CL\", \"BUN\", \"CREATININE\", \"EGFRIFAFRI\", \"EGFRIFNONA\", \"BCR\", \"ANIONGAP\"  No results found for: \"WBC\", \"HGB\", \"HCT\", \"MCV\", \"PLT\"  No results found for: \"INR\", \"PROTIME\"  No results found for: \"TSH\", \"O6DJHAL\", \"T4KHBSV\", \"THYROIDAB\"    I personally viewed and interpreted the patient's EKG/Telemetry/lab data    Corrine Padron Arun  reports that she quit smoking about 3 years ago. Her smoking use included cigarettes. She started smoking about 40 years ago. She has a 18.1 pack-year smoking history. She has been exposed to tobacco smoke. She has never used smokeless tobacco. I have educated her on the risk of diseases from using tobacco products such as cancer, COPD, and heart disease.           Assessment and Plan  Diagnoses and all orders for this visit:    1. " PVC (premature ventricular contraction) (Primary)    2. Sleep apnea, unspecified type    Other orders  -     flecainide (TAMBOCOR) 50 MG tablet; Take 1 tablet by mouth 2 (Two) Times a Day.  Dispense: 180 tablet; Refill: 3  -     ECG 12 Lead      PVCs  -24h Holter monitor 2/28/23: PVC 18% burden, avg HR 72, range HR    -Echo, 2/2023: LVEF: 54%, mild left atrial enlargement, mild MR   -Doing well on flecainide 50 mg BID. QRS acceptable on EKG today.     PEPE  -Complaint with CPAP        Follow-Up  Return in about 6 months (around 8/11/2025).    Thank you for allowing me to participate in the care of your patient. Please to not hesitate to contact me with additional questions or concerns.     ANGIE Hogan

## (undated) DEVICE — THE SINGLE USE ETRAP – POLYP TRAP IS USED FOR SUCTION RETRIEVAL OF ENDOSCOPICALLY REMOVED POLYPS.: Brand: ETRAP

## (undated) DEVICE — CONN JET HYDRA H20 AUXILIARY DISP

## (undated) DEVICE — SOLIDIFIER LIQLOC PLS 1500CC BT

## (undated) DEVICE — SNAR POLYP CAPTIFLEX XS/OVL 11X2.4MM 240CM 1P/U

## (undated) DEVICE — Device: Brand: DEFENDO AIR/WATER/SUCTION AND BIOPSY VALVE

## (undated) DEVICE — SOL IRRG H2O PL/BG 1000ML STRL

## (undated) DEVICE — LINER SURG CANSTR SXN S/RIGD 1500CC

## (undated) DEVICE — Device

## (undated) DEVICE — THE STERILE LIGHT HANDLE COVER IS USED WITH STERIS SURGICAL LIGHTING AND VISUALIZATION SYSTEMS.